# Patient Record
Sex: FEMALE | URBAN - METROPOLITAN AREA
[De-identification: names, ages, dates, MRNs, and addresses within clinical notes are randomized per-mention and may not be internally consistent; named-entity substitution may affect disease eponyms.]

---

## 2023-01-16 ENCOUNTER — E-VISIT (OUTPATIENT)
Dept: TELEHEALTH | Age: 23
End: 2023-01-16
Payer: COMMERCIAL

## 2023-01-16 DIAGNOSIS — J40 BRONCHITIS: Primary | ICD-10-CM

## 2023-01-16 PROCEDURE — 99421 OL DIG E/M SVC 5-10 MIN: CPT | Performed by: PHYSICIAN ASSISTANT

## 2023-01-17 RX ORDER — BENZONATATE 200 MG/1
200 CAPSULE ORAL 3 TIMES DAILY PRN
Qty: 30 CAPSULE | Refills: 0 | Status: SHIPPED | OUTPATIENT
Start: 2023-01-17

## 2023-01-17 RX ORDER — PREDNISONE 20 MG/1
40 TABLET ORAL DAILY
Qty: 10 TABLET | Refills: 0 | Status: SHIPPED | OUTPATIENT
Start: 2023-01-17 | End: 2023-01-22

## 2023-01-17 RX ORDER — LEVONORGESTREL AND ETHINYL ESTRADIOL 0.1-0.02MG
1 KIT ORAL DAILY
COMMUNITY
Start: 2022-12-31

## 2023-01-23 ENCOUNTER — E-VISIT (OUTPATIENT)
Dept: TELEHEALTH | Age: 23
End: 2023-01-23

## 2023-01-23 DIAGNOSIS — J01.90 ACUTE SINUSITIS, RECURRENCE NOT SPECIFIED, UNSPECIFIED LOCATION: Primary | ICD-10-CM

## 2023-01-23 PROCEDURE — 99421 OL DIG E/M SVC 5-10 MIN: CPT | Performed by: PHYSICIAN ASSISTANT

## 2023-01-23 RX ORDER — AMOXICILLIN AND CLAVULANATE POTASSIUM 875; 125 MG/1; MG/1
1 TABLET, FILM COATED ORAL 2 TIMES DAILY
Qty: 14 TABLET | Refills: 0 | Status: SHIPPED | OUTPATIENT
Start: 2023-01-23 | End: 2023-01-30

## 2023-02-24 ENCOUNTER — E-VISIT (OUTPATIENT)
Dept: TELEHEALTH | Age: 23
End: 2023-02-24

## 2023-02-24 DIAGNOSIS — J06.9 VIRAL URI WITH COUGH: Primary | ICD-10-CM

## 2023-02-28 ENCOUNTER — OFFICE VISIT (OUTPATIENT)
Dept: FAMILY MEDICINE CLINIC | Facility: CLINIC | Age: 23
End: 2023-02-28
Payer: COMMERCIAL

## 2023-02-28 VITALS
DIASTOLIC BLOOD PRESSURE: 72 MMHG | WEIGHT: 162 LBS | HEART RATE: 87 BPM | RESPIRATION RATE: 16 BRPM | TEMPERATURE: 98 F | OXYGEN SATURATION: 99 % | SYSTOLIC BLOOD PRESSURE: 126 MMHG

## 2023-02-28 DIAGNOSIS — J30.9 ALLERGIC RHINITIS, UNSPECIFIED SEASONALITY, UNSPECIFIED TRIGGER: Primary | ICD-10-CM

## 2023-02-28 DIAGNOSIS — R19.7 DIARRHEA, UNSPECIFIED TYPE: ICD-10-CM

## 2023-02-28 PROCEDURE — 3078F DIAST BP <80 MM HG: CPT | Performed by: FAMILY MEDICINE

## 2023-02-28 PROCEDURE — 99213 OFFICE O/P EST LOW 20 MIN: CPT | Performed by: FAMILY MEDICINE

## 2023-02-28 PROCEDURE — 3074F SYST BP LT 130 MM HG: CPT | Performed by: FAMILY MEDICINE

## 2023-02-28 RX ORDER — MOMETASONE FUROATE 50 UG/1
2 SPRAY, METERED NASAL DAILY
Qty: 1 EACH | Refills: 1 | Status: SHIPPED | OUTPATIENT
Start: 2023-02-28 | End: 2023-02-28

## 2023-02-28 RX ORDER — LORATADINE 10 MG/1
10 TABLET ORAL DAILY
Qty: 30 TABLET | Refills: 0 | Status: SHIPPED | OUTPATIENT
Start: 2023-02-28

## 2023-02-28 RX ORDER — MOMETASONE FUROATE 50 UG/1
2 SPRAY, METERED NASAL DAILY
Qty: 1 EACH | Refills: 1 | Status: SHIPPED | OUTPATIENT
Start: 2023-02-28 | End: 2023-03-30

## 2023-03-06 ENCOUNTER — OFFICE VISIT (OUTPATIENT)
Dept: FAMILY MEDICINE CLINIC | Facility: CLINIC | Age: 23
End: 2023-03-06
Payer: COMMERCIAL

## 2023-03-06 VITALS
SYSTOLIC BLOOD PRESSURE: 137 MMHG | DIASTOLIC BLOOD PRESSURE: 75 MMHG | RESPIRATION RATE: 18 BRPM | HEIGHT: 61 IN | WEIGHT: 163 LBS | TEMPERATURE: 99 F | HEART RATE: 96 BPM | BODY MASS INDEX: 30.78 KG/M2 | OXYGEN SATURATION: 97 %

## 2023-03-06 DIAGNOSIS — R09.81 SINUS CONGESTION: ICD-10-CM

## 2023-03-06 DIAGNOSIS — U07.1 POSITIVE SELF-ADMINISTERED ANTIGEN TEST FOR COVID-19: Primary | ICD-10-CM

## 2023-03-06 DIAGNOSIS — B34.9 VIRAL SYNDROME: ICD-10-CM

## 2023-03-06 DIAGNOSIS — J02.9 SORE THROAT: ICD-10-CM

## 2023-03-06 LAB
CONTROL LINE PRESENT WITH A CLEAR BACKGROUND (YES/NO): YES YES/NO
OPERATOR ID: NORMAL
POCT LOT NUMBER: NORMAL
RAPID SARS-COV-2 BY PCR: NOT DETECTED
STREP GRP A CUL-SCR: NEGATIVE

## 2023-03-06 PROCEDURE — 87637 SARSCOV2&INF A&B&RSV AMP PRB: CPT | Performed by: NURSE PRACTITIONER

## 2023-03-06 PROCEDURE — 87081 CULTURE SCREEN ONLY: CPT | Performed by: NURSE PRACTITIONER

## 2023-03-07 LAB
FLUAV + FLUBV RNA SPEC NAA+PROBE: NOT DETECTED
FLUAV + FLUBV RNA SPEC NAA+PROBE: NOT DETECTED
RSV RNA SPEC NAA+PROBE: NOT DETECTED
SARS-COV-2 RNA RESP QL NAA+PROBE: NOT DETECTED

## 2023-03-07 NOTE — PATIENT INSTRUCTIONS
1. Rest. Drink plenty of fluids. 2. Supportive care as discussed. 3. Home covid-19 test appears positive. Our rapid covid-19 test is negative. We do send out PCR test to lab. Pt advised that safest approach would be to self quarantine per CDC guidelines. (Until 5 days from onset of symptoms. If you have no symptoms or your symptoms are resolving after 5 days, you can leave your house. Continue to wear a mask around others for 5 additional days. If symptoms not resolved at 5 days continue quarantine for up to 10 days from onset of symptoms AND no fever for at least 24hrs, AND and improvement in symptoms. 4. Follow up with PMD in 4-5 days for re-eval. Go to the emergency department immediately if symptoms worsen, change, you develop chest discomfort, wheezing, shortness of breath, or if you have any concerns.

## 2023-03-08 ENCOUNTER — TELEPHONE (OUTPATIENT)
Dept: FAMILY MEDICINE CLINIC | Facility: CLINIC | Age: 23
End: 2023-03-08

## 2023-03-08 NOTE — TELEPHONE ENCOUNTER
Received message to call pt. We discussed negative viral panel results. Pt notes chills, body aches, headaches have resolved. Notes still having nonproductive cough. She notes she is taking tylenol at home. Treatment options discussed with patient and explained in detail. We reviewed symptomatic care at home and advised follow up with PCP in 2-3 days. . The risks, benefits and potential side effects of the medications were reviewed. Alternatives were discussed. Monitoring parameters and expected course outlined. Patient to go to emergency department if symptoms fail to respond as outlined, or worsen in any way. The patient agreed with the plan.

## 2023-03-09 ENCOUNTER — E-VISIT (OUTPATIENT)
Dept: TELEHEALTH | Age: 23
End: 2023-03-09

## 2023-03-09 DIAGNOSIS — J06.9 RECURRENT URI (UPPER RESPIRATORY INFECTION): Primary | ICD-10-CM

## 2023-03-09 DIAGNOSIS — R05.8 PRODUCTIVE COUGH: ICD-10-CM

## 2023-03-09 RX ORDER — DOXYCYCLINE HYCLATE 100 MG/1
100 CAPSULE ORAL 2 TIMES DAILY
Qty: 20 CAPSULE | Refills: 0 | Status: SHIPPED | OUTPATIENT
Start: 2023-03-09 | End: 2023-03-19

## 2023-03-23 ENCOUNTER — OFFICE VISIT (OUTPATIENT)
Dept: FAMILY MEDICINE CLINIC | Facility: CLINIC | Age: 23
End: 2023-03-23
Payer: COMMERCIAL

## 2023-03-23 VITALS
DIASTOLIC BLOOD PRESSURE: 70 MMHG | WEIGHT: 164 LBS | BODY MASS INDEX: 31 KG/M2 | HEART RATE: 105 BPM | OXYGEN SATURATION: 96 % | TEMPERATURE: 98 F | SYSTOLIC BLOOD PRESSURE: 114 MMHG

## 2023-03-23 DIAGNOSIS — Z13.1 SCREENING FOR DIABETES MELLITUS: ICD-10-CM

## 2023-03-23 DIAGNOSIS — R53.83 OTHER FATIGUE: ICD-10-CM

## 2023-03-23 DIAGNOSIS — J31.0 CHRONIC RHINITIS: ICD-10-CM

## 2023-03-23 DIAGNOSIS — R05.3 CHRONIC COUGH: Primary | ICD-10-CM

## 2023-03-23 DIAGNOSIS — R00.0 TACHYCARDIA: ICD-10-CM

## 2023-03-23 DIAGNOSIS — Z13.0 SCREENING, IRON DEFICIENCY ANEMIA: ICD-10-CM

## 2023-03-23 DIAGNOSIS — Z13.29 SCREENING FOR THYROID DISORDER: ICD-10-CM

## 2023-03-23 LAB
ALBUMIN SERPL-MCNC: 3.9 G/DL (ref 3.4–5)
ALBUMIN/GLOB SERPL: 1 {RATIO} (ref 1–2)
ALP LIVER SERPL-CCNC: 71 U/L
ALT SERPL-CCNC: 46 U/L
ANION GAP SERPL CALC-SCNC: 6 MMOL/L (ref 0–18)
AST SERPL-CCNC: 26 U/L (ref 15–37)
BASOPHILS # BLD AUTO: 0.06 X10(3) UL (ref 0–0.2)
BASOPHILS NFR BLD AUTO: 1 %
BILIRUB SERPL-MCNC: 0.4 MG/DL (ref 0.1–2)
BUN BLD-MCNC: 11 MG/DL (ref 7–18)
CALCIUM BLD-MCNC: 9.5 MG/DL (ref 8.5–10.1)
CHLORIDE SERPL-SCNC: 111 MMOL/L (ref 98–112)
CO2 SERPL-SCNC: 23 MMOL/L (ref 21–32)
CREAT BLD-MCNC: 0.67 MG/DL
EOSINOPHIL # BLD AUTO: 0.1 X10(3) UL (ref 0–0.7)
EOSINOPHIL NFR BLD AUTO: 1.6 %
ERYTHROCYTE [DISTWIDTH] IN BLOOD BY AUTOMATED COUNT: 12.6 %
EST. AVERAGE GLUCOSE BLD GHB EST-MCNC: 105 MG/DL (ref 68–126)
FASTING STATUS PATIENT QL REPORTED: NO
GFR SERPLBLD BASED ON 1.73 SQ M-ARVRAT: 127 ML/MIN/1.73M2 (ref 60–?)
GLOBULIN PLAS-MCNC: 3.9 G/DL (ref 2.8–4.4)
GLUCOSE BLD-MCNC: 86 MG/DL (ref 70–99)
HBA1C MFR BLD: 5.3 % (ref ?–5.7)
HCT VFR BLD AUTO: 45.6 %
HGB BLD-MCNC: 14.8 G/DL
IMM GRANULOCYTES # BLD AUTO: 0.01 X10(3) UL (ref 0–1)
IMM GRANULOCYTES NFR BLD: 0.2 %
LYMPHOCYTES # BLD AUTO: 1.53 X10(3) UL (ref 1–4)
LYMPHOCYTES NFR BLD AUTO: 24.3 %
MCH RBC QN AUTO: 28.5 PG (ref 26–34)
MCHC RBC AUTO-ENTMCNC: 32.5 G/DL (ref 31–37)
MCV RBC AUTO: 87.9 FL
MONOCYTES # BLD AUTO: 0.67 X10(3) UL (ref 0.1–1)
MONOCYTES NFR BLD AUTO: 10.6 %
NEUTROPHILS # BLD AUTO: 3.93 X10 (3) UL (ref 1.5–7.7)
NEUTROPHILS # BLD AUTO: 3.93 X10(3) UL (ref 1.5–7.7)
NEUTROPHILS NFR BLD AUTO: 62.3 %
OSMOLALITY SERPL CALC.SUM OF ELEC: 289 MOSM/KG (ref 275–295)
PLATELET # BLD AUTO: 359 10(3)UL (ref 150–450)
POTASSIUM SERPL-SCNC: 4 MMOL/L (ref 3.5–5.1)
PROT SERPL-MCNC: 7.8 G/DL (ref 6.4–8.2)
RBC # BLD AUTO: 5.19 X10(6)UL
SODIUM SERPL-SCNC: 140 MMOL/L (ref 136–145)
T4 FREE SERPL-MCNC: 1.1 NG/DL (ref 0.8–1.7)
TSI SER-ACNC: 1.21 MIU/ML (ref 0.36–3.74)
VIT D+METAB SERPL-MCNC: 23 NG/ML (ref 30–100)
WBC # BLD AUTO: 6.3 X10(3) UL (ref 4–11)

## 2023-03-23 PROCEDURE — 82306 VITAMIN D 25 HYDROXY: CPT | Performed by: NURSE PRACTITIONER

## 2023-03-23 PROCEDURE — 86003 ALLG SPEC IGE CRUDE XTRC EA: CPT | Performed by: NURSE PRACTITIONER

## 2023-03-23 PROCEDURE — 80050 GENERAL HEALTH PANEL: CPT | Performed by: NURSE PRACTITIONER

## 2023-03-23 PROCEDURE — 83036 HEMOGLOBIN GLYCOSYLATED A1C: CPT | Performed by: NURSE PRACTITIONER

## 2023-03-23 PROCEDURE — 3078F DIAST BP <80 MM HG: CPT | Performed by: NURSE PRACTITIONER

## 2023-03-23 PROCEDURE — 99214 OFFICE O/P EST MOD 30 MIN: CPT | Performed by: NURSE PRACTITIONER

## 2023-03-23 PROCEDURE — 82785 ASSAY OF IGE: CPT | Performed by: NURSE PRACTITIONER

## 2023-03-23 PROCEDURE — 84439 ASSAY OF FREE THYROXINE: CPT | Performed by: NURSE PRACTITIONER

## 2023-03-23 PROCEDURE — 3074F SYST BP LT 130 MM HG: CPT | Performed by: NURSE PRACTITIONER

## 2023-03-23 RX ORDER — LEVONORGESTREL AND ETHINYL ESTRADIOL 0.1-0.02MG
1 KIT ORAL DAILY
Qty: 28 TABLET | Refills: 12 | COMMUNITY
Start: 2023-03-23

## 2023-03-24 ENCOUNTER — PATIENT MESSAGE (OUTPATIENT)
Dept: FAMILY MEDICINE CLINIC | Facility: CLINIC | Age: 23
End: 2023-03-24

## 2023-03-24 ENCOUNTER — TELEPHONE (OUTPATIENT)
Dept: FAMILY MEDICINE CLINIC | Facility: CLINIC | Age: 23
End: 2023-03-24

## 2023-03-24 LAB
A ALTERNATA IGE QN: <0.1 KUA/L (ref ?–0.1)
A FUMIGATUS IGE QN: <0.1 KUA/L (ref ?–0.1)
AMER SYCAMORE IGE QN: <0.1 KUA/L (ref ?–0.1)
BERMUDA GRASS IGE QN: <0.1 KUA/L (ref ?–0.1)
BOXELDER IGE QN: <0.1 KUA/L (ref ?–0.1)
C HERBARUM IGE QN: <0.1 KUA/L (ref ?–0.1)
CALIF WALNUT IGE QN: <0.1 KUA/L (ref ?–0.1)
CAT DANDER IGE QN: <0.1 KUA/L (ref ?–0.1)
CLAM IGE QN: <0.1 KUA/L (ref ?–0.1)
CMN PIGWEED IGE QN: <0.1 KUA/L (ref ?–0.1)
CODFISH IGE QN: <0.1 KUA/L (ref ?–0.1)
COMMON RAGWEED IGE QN: <0.1 KUA/L (ref ?–0.1)
CORN IGE QN: <0.1 KUA/L (ref ?–0.1)
COTTONWOOD IGE QN: <0.1 KUA/L (ref ?–0.1)
COW MILK IGE QN: <0.1 KUA/L (ref ?–0.1)
D FARINAE IGE QN: 0.18 KUA/L (ref ?–0.1)
D PTERONYSS IGE QN: 0.18 KUA/L (ref ?–0.1)
DOG DANDER IGE QN: <0.1 KUA/L (ref ?–0.1)
EGG WHITE IGE QN: <0.1 KUA/L (ref ?–0.1)
IGE SERPL-ACNC: 111 KU/L (ref 2–214)
IGE SERPL-ACNC: 113 KU/L (ref 2–214)
M RACEMOSUS IGE QN: <0.1 KUA/L (ref ?–0.1)
MARSH ELDER IGE QN: <0.1 KUA/L (ref ?–0.1)
MOUSE EPITH IGE QN: <0.1 KUA/L (ref ?–0.1)
MT JUNIPER IGE QN: <0.1 KUA/L (ref ?–0.1)
P NOTATUM IGE QN: <0.1 KUA/L (ref ?–0.1)
PEANUT IGE QN: <0.1 KUA/L (ref ?–0.1)
PECAN/HICK TREE IGE QN: <0.1 KUA/L (ref ?–0.1)
ROACH IGE QN: 0.15 KUA/L (ref ?–0.1)
SALTWORT IGE QN: <0.1 KUA/L (ref ?–0.1)
SCALLOP IGE QN: <0.1 KUA/L (ref ?–0.1)
SESAME SEED IGE QN: <0.1 KUA/L (ref ?–0.1)
SHRIMP IGE QN: 0.14 KUA/L (ref ?–0.1)
SOYBEAN IGE QN: <0.1 KUA/L (ref ?–0.1)
TIMOTHY IGE QN: <0.1 KUA/L (ref ?–0.1)
WALNUT IGE QN: <0.1 KUA/L (ref ?–0.1)
WHEAT IGE QN: <0.1 KUA/L (ref ?–0.1)
WHITE ASH IGE QN: <0.1 KUA/L (ref ?–0.1)
WHITE ELM IGE QN: <0.1 KUA/L (ref ?–0.1)
WHITE MULBERRY IGE QN: <0.1 KUA/L (ref ?–0.1)
WHITE OAK IGE QN: <0.1 KUA/L (ref ?–0.1)

## 2023-03-24 NOTE — TELEPHONE ENCOUNTER
Patient advised and verbalized understanding. Patient also notified she is allergic to shrimp and to avoid shellfish. Patient verbalized understanding.

## 2023-03-24 NOTE — TELEPHONE ENCOUNTER
----- Message from SILVIO Dutton sent at 3/23/2023  5:02 PM CDT -----  Vitamin D mildly low. Start oral Vitamin D 2000 international units daily.  Recheck 3 mo  Chemistries normal  Thyroid normal  No anemia or abnormalities with CBC

## 2023-03-24 NOTE — TELEPHONE ENCOUNTER
From: Bonnie Biggs  To: SILVIO Moore  Sent: 3/24/2023 9:06 AM CDT  Subject: Question    Hi! I know that you probably still have to check out my results since they just came through. Will i see the allergy results on here too?

## 2023-04-03 ENCOUNTER — E-VISIT (OUTPATIENT)
Dept: TELEHEALTH | Age: 23
End: 2023-04-03

## 2023-04-03 DIAGNOSIS — H10.32 ACUTE CONJUNCTIVITIS OF LEFT EYE, UNSPECIFIED ACUTE CONJUNCTIVITIS TYPE: Primary | ICD-10-CM

## 2023-04-03 PROCEDURE — 99421 OL DIG E/M SVC 5-10 MIN: CPT | Performed by: NURSE PRACTITIONER

## 2023-04-03 RX ORDER — POLYMYXIN B SULFATE AND TRIMETHOPRIM 1; 10000 MG/ML; [USP'U]/ML
1 SOLUTION OPHTHALMIC EVERY 4 HOURS
Qty: 1 EACH | Refills: 0 | Status: SHIPPED | OUTPATIENT
Start: 2023-04-03 | End: 2023-04-10

## 2023-04-06 ENCOUNTER — E-VISIT (OUTPATIENT)
Dept: TELEHEALTH | Age: 23
End: 2023-04-06

## 2023-04-06 DIAGNOSIS — Z02.9 ENCOUNTERS FOR ADMINISTRATIVE PURPOSES: Primary | ICD-10-CM

## 2023-04-11 ENCOUNTER — OFFICE VISIT (OUTPATIENT)
Dept: FAMILY MEDICINE CLINIC | Facility: CLINIC | Age: 23
End: 2023-04-11
Payer: COMMERCIAL

## 2023-04-11 VITALS
SYSTOLIC BLOOD PRESSURE: 110 MMHG | HEIGHT: 61 IN | BODY MASS INDEX: 30.36 KG/M2 | HEART RATE: 82 BPM | OXYGEN SATURATION: 98 % | DIASTOLIC BLOOD PRESSURE: 66 MMHG | TEMPERATURE: 98 F | WEIGHT: 160.81 LBS | RESPIRATION RATE: 18 BRPM

## 2023-04-11 DIAGNOSIS — K21.9 GASTROESOPHAGEAL REFLUX DISEASE, UNSPECIFIED WHETHER ESOPHAGITIS PRESENT: Primary | ICD-10-CM

## 2023-04-11 PROCEDURE — 99213 OFFICE O/P EST LOW 20 MIN: CPT | Performed by: FAMILY MEDICINE

## 2023-04-11 RX ORDER — PANTOPRAZOLE SODIUM 40 MG/1
40 TABLET, DELAYED RELEASE ORAL
Qty: 30 TABLET | Refills: 0 | Status: SHIPPED | OUTPATIENT
Start: 2023-04-11 | End: 2023-05-11

## 2023-04-11 NOTE — PATIENT INSTRUCTIONS
Tiago Rizvi! Please take the pantoprazole daily for 2-3 weeks and then stop it. If symptoms resolve and do not return you do not need to do anything further. If symptoms return please see your PCP for further evaluation. If at any time you develop severe abdominal pain or vomiting that does not stop please go to the nearest emergency room.

## 2023-05-18 ENCOUNTER — TELEMEDICINE (OUTPATIENT)
Dept: FAMILY MEDICINE CLINIC | Facility: CLINIC | Age: 23
End: 2023-05-18
Payer: COMMERCIAL

## 2023-05-18 ENCOUNTER — TELEPHONE (OUTPATIENT)
Dept: FAMILY MEDICINE CLINIC | Facility: CLINIC | Age: 23
End: 2023-05-18

## 2023-05-18 DIAGNOSIS — R11.2 NAUSEA AND VOMITING, UNSPECIFIED VOMITING TYPE: ICD-10-CM

## 2023-05-18 DIAGNOSIS — K21.9 GASTROESOPHAGEAL REFLUX DISEASE, UNSPECIFIED WHETHER ESOPHAGITIS PRESENT: Primary | ICD-10-CM

## 2023-05-18 PROCEDURE — 99213 OFFICE O/P EST LOW 20 MIN: CPT | Performed by: NURSE PRACTITIONER

## 2023-05-18 RX ORDER — PANTOPRAZOLE SODIUM 40 MG/1
40 TABLET, DELAYED RELEASE ORAL
Qty: 90 TABLET | Refills: 0 | Status: SHIPPED | OUTPATIENT
Start: 2023-05-18 | End: 2023-08-16

## 2023-06-16 RX ORDER — LEVONORGESTREL AND ETHINYL ESTRADIOL 0.1-0.02MG
1 KIT ORAL DAILY
Qty: 28 TABLET | Refills: 12 | Status: SHIPPED | OUTPATIENT
Start: 2023-06-16

## 2023-06-16 NOTE — TELEPHONE ENCOUNTER
Gynecology Medication Protocol Failed 06/16/2023 11:18 AM    PASS-PENDING LAST PAP WNL--VIA MANUAL LOOKUP    Physical or Pelvic/Breast in past 12 or next 3 mos--VIA MANUAL LOOKUP     Last OV 3/23/23  Not filled previously, listed as historical

## 2023-06-17 ENCOUNTER — TELEPHONE (OUTPATIENT)
Dept: FAMILY MEDICINE CLINIC | Facility: CLINIC | Age: 23
End: 2023-06-17

## 2023-06-19 ENCOUNTER — TELEPHONE (OUTPATIENT)
Dept: FAMILY MEDICINE CLINIC | Facility: CLINIC | Age: 23
End: 2023-06-19

## 2023-06-19 DIAGNOSIS — R79.89 LOW VITAMIN D LEVEL: Primary | ICD-10-CM

## 2023-07-10 DIAGNOSIS — K21.9 GASTROESOPHAGEAL REFLUX DISEASE, UNSPECIFIED WHETHER ESOPHAGITIS PRESENT: ICD-10-CM

## 2023-07-10 DIAGNOSIS — R11.2 NAUSEA AND VOMITING, UNSPECIFIED VOMITING TYPE: ICD-10-CM

## 2023-07-10 RX ORDER — PANTOPRAZOLE SODIUM 40 MG/1
TABLET, DELAYED RELEASE ORAL
Qty: 30 TABLET | Refills: 2 | OUTPATIENT
Start: 2023-07-10

## 2023-07-10 NOTE — TELEPHONE ENCOUNTER
Patient advised. Verbalized understanding.    States still looking into best coverage with her ins for GI  Will call back to schedule Px

## 2023-07-10 NOTE — TELEPHONE ENCOUNTER
LOV 3/23/23 to establish care and cough. Medication Quantity Refills Start End   pantoprazole 40 MG Oral Tab EC 90 tablet 0 5/18/2023 8/16/2023   Sig:   Take 1 tablet (40 mg total) by mouth every morning before breakfast.       No future appointments. Please advise - needs to scheduled px? Unsure who is PCP. ..

## 2023-07-10 NOTE — TELEPHONE ENCOUNTER
Telemed visit done in May  H.Pylori testing ordered but not done  GI referral placed, did she schedule?     Needs to schedule PX

## 2023-07-12 ENCOUNTER — E-VISIT (OUTPATIENT)
Dept: TELEHEALTH | Age: 23
End: 2023-07-12

## 2023-07-12 DIAGNOSIS — B34.9 ACUTE VIRAL SYNDROME: Primary | ICD-10-CM

## 2023-07-12 PROCEDURE — 99422 OL DIG E/M SVC 11-20 MIN: CPT | Performed by: NURSE PRACTITIONER

## 2023-07-13 NOTE — PROGRESS NOTES
Refer to patient Questionnaire and responses. See MyChart messages. 15 minutes spent in direct communication with patient via [facility].

## 2023-07-19 ENCOUNTER — TELEPHONE (OUTPATIENT)
Dept: FAMILY MEDICINE CLINIC | Facility: CLINIC | Age: 23
End: 2023-07-19

## 2023-08-24 ENCOUNTER — TELEPHONE (OUTPATIENT)
Dept: FAMILY MEDICINE CLINIC | Facility: CLINIC | Age: 23
End: 2023-08-24

## 2023-08-24 RX ORDER — LEVONORGESTREL AND ETHINYL ESTRADIOL 0.1-0.02MG
1 KIT ORAL DAILY
Qty: 28 TABLET | Refills: 0 | Status: SHIPPED | OUTPATIENT
Start: 2023-08-24

## 2023-08-24 NOTE — TELEPHONE ENCOUNTER
LOV: 5/18/23 GERD    Levonorgestrel-Ethinyl Estrad (SRONYX) 0.1-20 MG-MCG Oral Tab  Take 1 tablet by mouth daily. Dispense: 28 tablet, Refills: 12 ordered       06/16/2023     No future appointments.   Please advise    Gynecology Medication Protocol Otcoap1208/24/2023 03:58 PM    PASS-PENDING LAST PAP WNL--VIA MANUAL LOOKUP    Physical or Pelvic/Breast in past 12 or next 3 mos--VIA MANUAL LOOKUP

## 2023-08-25 NOTE — TELEPHONE ENCOUNTER
Pt scheduled for   Future Appointments   Date Time Provider Deon Gray   9/14/2023  4:20 PM SILVIO Byrne [facility] Lynda Nunez

## 2023-09-28 ENCOUNTER — E-VISIT (OUTPATIENT)
Dept: TELEHEALTH | Age: 23
End: 2023-09-28
Payer: COMMERCIAL

## 2023-09-28 DIAGNOSIS — R05.1 ACUTE COUGH: ICD-10-CM

## 2023-09-28 DIAGNOSIS — J01.40 ACUTE NON-RECURRENT PANSINUSITIS: Primary | ICD-10-CM

## 2023-09-28 RX ORDER — BENZONATATE 200 MG/1
CAPSULE ORAL
Qty: 30 CAPSULE | Refills: 0 | Status: SHIPPED | OUTPATIENT
Start: 2023-09-28

## 2023-09-28 RX ORDER — AMOXICILLIN AND CLAVULANATE POTASSIUM 875; 125 MG/1; MG/1
1 TABLET, FILM COATED ORAL 2 TIMES DAILY
Qty: 20 TABLET | Refills: 0 | Status: SHIPPED | OUTPATIENT
Start: 2023-09-28 | End: 2023-10-08

## 2023-09-29 NOTE — PROGRESS NOTES
Refer to patient Questionnaire and responses. See MyChart messages. 25 minutes spent in direct communication with patient via [facility].

## 2023-10-30 RX ORDER — LEVONORGESTREL AND ETHINYL ESTRADIOL 0.1-0.02MG
1 KIT ORAL DAILY
Qty: 28 TABLET | Refills: 0 | Status: SHIPPED | OUTPATIENT
Start: 2023-10-30

## 2023-10-30 NOTE — TELEPHONE ENCOUNTER
LOV 3/23/23 for a cough. Patient comment: Gregory Scott! I know we talked last about how you want to do an exam before prescribing more to me. I am unfortunately almost out and do need it so I dont miss, but my work schedule comes out this Wednesday so I will then schedule an appointment that works! Gynecology Medication Protocol Oeabjz29/30/2023 12:02 PM    PASS-PENDING LAST PAP WNL--VIA MANUAL LOOKUP    Physical or Pelvic/Breast in past 12 or next 3 mos--VIA MANUAL LOOKUP          No future appointments. Please advise.

## 2023-11-02 ENCOUNTER — E-VISIT (OUTPATIENT)
Dept: TELEHEALTH | Age: 23
End: 2023-11-02
Payer: COMMERCIAL

## 2023-11-02 DIAGNOSIS — J06.9 ACUTE URI: Primary | ICD-10-CM

## 2023-11-02 PROCEDURE — 99421 OL DIG E/M SVC 5-10 MIN: CPT | Performed by: PHYSICIAN ASSISTANT

## 2023-11-02 NOTE — PROGRESS NOTES
Leonel Easton is a 26year old female who initiated e-visit care today. HPI:   See answers to questionnaire submission     Current Outpatient Medications   Medication Sig Dispense Refill    Levonorgestrel-Ethinyl Estrad (SRONYX) 0.1-20 MG-MCG Oral Tab Take 1 tablet by mouth daily. 28 tablet 0    benzonatate 200 MG Oral Cap Take 1 tablet po tid as needed for cough 30 capsule 0    loratadine (CLARITIN) 10 MG Oral Tab Take 1 tablet (10 mg total) by mouth daily. 30 tablet 0      Past Medical History:   Diagnosis Date    Allergic rhinitis       Past Surgical History:   Procedure Laterality Date    DENTAL SURGERY PROCEDURE      DIAPHRAGMATIC HERNIA REPAIR      TONSILLECTOMY        Family History   Problem Relation Age of Onset    COPD Father     Asthma Brother         since having COVID 2022      Social History:  Social History     Socioeconomic History    Marital status: Single   Tobacco Use    Smoking status: Never    Smokeless tobacco: Never         ASSESSMENT AND PLAN:       Diagnoses and all orders for this visit:    Acute URI       Viral vs bacterial infections discussed. Advised seeking ENT as has numerous telehealth visits for sinus concerns. S/sx that warrant recheck discussed. Duration of  the service:  10 minutes      See [employer] message exchange and Patient Instructions for Comfort Care and patient education.

## 2024-02-24 NOTE — TELEPHONE ENCOUNTER
I currently don’t have insurance through my fairly new job. I had to leave where I had insurance so I am unable to pay that out of my pocket at the current moment. My moms been helping and I may be able to get on her insurance soon or we can afford it shortly but currently we aren’t very able.

## 2024-02-24 NOTE — TELEPHONE ENCOUNTER
Gynecology Medication Protocol Vqbczy1002/23/2024 11:14 PM    PASS-PENDING LAST PAP WNL--VIA MANUAL LOOKUP    Physical or Pelvic/Breast in past 12 or next 3 mos--VIA MANUAL LOOKUP     Last OV 3/23/23 for cough  No future appointments.   Due for Px/pap  MCM sent

## 2024-02-26 RX ORDER — LEVONORGESTREL AND ETHINYL ESTRADIOL 0.1-0.02MG
1 KIT ORAL DAILY
Qty: 28 TABLET | Refills: 0 | Status: SHIPPED | OUTPATIENT
Start: 2024-02-26

## 2024-02-29 ENCOUNTER — E-VISIT (OUTPATIENT)
Dept: TELEHEALTH | Age: 24
End: 2024-02-29

## 2024-02-29 DIAGNOSIS — R10.9 ABDOMINAL PAIN, UNSPECIFIED ABDOMINAL LOCATION: Primary | ICD-10-CM

## 2024-02-29 PROCEDURE — 99212 OFFICE O/P EST SF 10 MIN: CPT | Performed by: NURSE PRACTITIONER

## 2024-02-29 NOTE — PROGRESS NOTES
Belkys Alvarado is a 24 year old female.  HPI:   See answers to questions above.   Mid abdominal pain. Treated with gas-x without improvement. Symptoms x 3 days.   Current Outpatient Medications   Medication Sig Dispense Refill    Levonorgestrel-Ethinyl Estrad (SRONYX) 0.1-20 MG-MCG Oral Tab Take 1 tablet by mouth daily. 28 tablet 0    benzonatate 200 MG Oral Cap Take 1 tablet po tid as needed for cough 30 capsule 0    loratadine (CLARITIN) 10 MG Oral Tab Take 1 tablet (10 mg total) by mouth daily. 30 tablet 0      Past Medical History:   Diagnosis Date    Allergic rhinitis       Past Surgical History:   Procedure Laterality Date    DENTAL SURGERY PROCEDURE      DIAPHRAGMATIC HERNIA REPAIR      TONSILLECTOMY        Family History   Problem Relation Age of Onset    COPD Father     Asthma Brother         since having COVID 2022      Social History:  Social History     Socioeconomic History    Marital status: Single   Tobacco Use    Smoking status: Never    Smokeless tobacco: Never         ASSESSMENT AND PLAN:     Encounter Diagnosis   Name Primary?    Abdominal pain, unspecified abdominal location Yes     Advised in person evaluation is necessary for abdominal pain. Advised if pain is moderate to severe should be seen in ER. If pain is more mild may be seen in [facility].         Meds & Refills for this Visit:  Requested Prescriptions      No prescriptions requested or ordered in this encounter       Duration of  the service:  6 minutes    Patient advised to follow up with PCP if no improvement or worsening of symptoms  Refer to [employer] message for specific patient instructions

## 2024-03-03 ENCOUNTER — HOSPITAL ENCOUNTER (OUTPATIENT)
Age: 24
Discharge: HOME | End: 2024-03-03

## 2024-03-03 VITALS
TEMPERATURE: 98 F | BODY MASS INDEX: 31.53 KG/M2 | HEART RATE: 115 BPM | HEIGHT: 61 IN | DIASTOLIC BLOOD PRESSURE: 87 MMHG | OXYGEN SATURATION: 98 % | SYSTOLIC BLOOD PRESSURE: 137 MMHG | RESPIRATION RATE: 18 BRPM | WEIGHT: 167 LBS

## 2024-03-03 DIAGNOSIS — R10.9 ABDOMINAL PAIN, ACUTE: Primary | ICD-10-CM

## 2024-03-03 LAB
#MXD IC: 0.6 X10ˆ3/UL (ref 0.1–1)
B-HCG UR QL: NEGATIVE
BILIRUB UR QL STRIP: NEGATIVE
BUN BLD-MCNC: 10 MG/DL (ref 7–18)
CHLORIDE BLD-SCNC: 105 MMOL/L (ref 98–112)
CLARITY UR: CLEAR
CO2 BLD-SCNC: 22 MMOL/L (ref 21–32)
COLOR UR: YELLOW
CREAT BLD-MCNC: 0.6 MG/DL
EGFRCR SERPLBLD CKD-EPI 2021: 130 ML/MIN/1.73M2 (ref 60–?)
GLUCOSE BLD-MCNC: 95 MG/DL (ref 70–99)
GLUCOSE UR STRIP-MCNC: NEGATIVE MG/DL
HCT VFR BLD AUTO: 44.4 %
HCT VFR BLD CALC: 43 %
HGB BLD-MCNC: 14.7 G/DL
HGB UR QL STRIP: NEGATIVE
ISTAT IONIZED CALCIUM FOR CHEM 8: 1.13 MMOL/L (ref 1.12–1.32)
KETONES UR STRIP-MCNC: NEGATIVE MG/DL
LEUKOCYTE ESTERASE UR QL STRIP: NEGATIVE
LYMPHOCYTES # BLD AUTO: 2 X10ˆ3/UL (ref 1–4)
LYMPHOCYTES NFR BLD AUTO: 33 %
MCH RBC QN AUTO: 28.3 PG (ref 26–34)
MCHC RBC AUTO-ENTMCNC: 33.1 G/DL (ref 31–37)
MCV RBC AUTO: 85.5 FL (ref 80–100)
MIXED CELL %: 10.6 %
NEUTROPHILS # BLD AUTO: 3.4 X10ˆ3/UL (ref 1.5–7.7)
NEUTROPHILS NFR BLD AUTO: 56.4 %
NITRITE UR QL STRIP: NEGATIVE
PH UR STRIP: 7.5 [PH]
PLATELET # BLD AUTO: 371 X10ˆ3/UL (ref 150–450)
POTASSIUM BLD-SCNC: 4 MMOL/L (ref 3.6–5.1)
RBC # BLD AUTO: 5.19 X10ˆ6/UL
SODIUM BLD-SCNC: 140 MMOL/L (ref 136–145)
SP GR UR STRIP: 1.02
UROBILINOGEN UR STRIP-ACNC: <2 MG/DL
WBC # BLD AUTO: 6 X10ˆ3/UL (ref 4–11)

## 2024-03-03 RX ORDER — OMEPRAZOLE 20 MG/1
20 CAPSULE, DELAYED RELEASE ORAL
COMMUNITY

## 2024-03-03 NOTE — ED PROVIDER NOTES
Patient Seen in: [facility]    History     Chief Complaint   Patient presents with    Abdominal Pain     Stated Complaint: Abdominal Pain    HPI    Patient complains of periumbilical  abdominal pain that began Tuesday.  Reports the pain feels like someone is intermittelty squessing her abdomen.   She reports this is not pain,   it is just a squeeze.  Reports that the pain is a 2/10.   Episodes last 10-15 seconds,  multiple times during the day.  She reports nothing makes the pain better or worse.  She is eating and drinking without difficulty.         Past Medical History:   Diagnosis Date    Allergic rhinitis        Past Surgical History:   Procedure Laterality Date    DENTAL SURGERY PROCEDURE      DIAPHRAGMATIC HERNIA REPAIR      TONSILLECTOMY              Family History   Problem Relation Age of Onset    COPD Father     Asthma Brother         since having COVID 2022       Social History     Socioeconomic History    Marital status: Single   Tobacco Use    Smoking status: Never    Smokeless tobacco: Never       Review of Systems    Positive for stated complaint: Abdominal Pain  Other systems are as noted in HPI.  Constitutional and vital signs reviewed.      All other systems reviewed and negative except as noted above.    PSFH elements reviewed from today and agreed except as otherwise stated in HPI.    Physical Exam     ED Triage Vitals [03/03/24 0838]   /87   Pulse 115   Resp 18   Temp 98.2 °F (36.8 °C)   Temp src Temporal   SpO2 98 %   O2 Device None (Room air)       Current:/87   Pulse 115   Temp 98.2 °F (36.8 °C) (Temporal)   Resp 18   Ht 154.9 cm (5' 1\")   Wt 75.8 kg   LMP 01/03/2024 (Exact Date)   SpO2 98%   BMI 31.55 kg/m²   Pulse ox 98% on RA         Physical Exam  General Appearance: alert, no distress  Eyes: pupils equal and round no pallor or injection  ENT, Mouth: mucous membranes moist  Respiratory: there are no retractions, lungs are clear to  auscultation  Cardiovascular: regular rate and rhythm    Gastrointestinal: soft, non tender, no mass, normal bowel sounds, no swelling, no ecchymosis, no pain at mcburney's point, negative easton sign.  Neurological: II-XII grossly intact  no focal deficits  Skin: warm and dry, no rashes.  Musculoskeletal: neck is supple non tender        Extremities are symmetrical, full range of motion  Psychiatric: patient is pleasant, there is no agitation        ED Course     Labs Reviewed   [facility] POCT URINALYSIS DIPSTICK - Abnormal; Notable for the following components:       Result Value    Protein urine Trace (*)     All other components within normal limits   POCT ISTAT CHEM8 CARTRIDGE - Normal   POCT PREGNANCY URINE - Normal   POCT CBC     I have personally  reviewed available prior medical records for any recent pertinent discharge summaries/testing. Patient/family updated on results and plan, a verbalized understanding and agreement with the plan.  I explained to the patient that emergent conditions may arise and to go to the ER for new, worsening or any persistent conditions. I've explained the importance of taking all medicatons as prescribed, follow up, and return precuations,  All questions answered.    Please note that this report has been produced using speech recognition software and may contain errors related to that system including, but not limited to, errors in grammar, punctuation, and spelling, as well as words and phrases that possibly may have been recognized inappropriately.  If there are any questions or concerns, contact the dictating provider for clarification.  MDM         Patient presents with abdominal pain, nausea and emesis. Basic labs were performed and did not reveal any leukocytosis, elevated creatinine or electrolyte abnormality.  Patient is afebrile, does not appear toxic and does not meet SIRS criteria.  . Patient does not appear clinically dehydrated and is able to tolerate po. Encouraged  patient on oral hydration. UA does not reveal evidence of UTI. Pregnancy test is negative. No vaginal discharge or suprapubic tenderness.      Discussed all labs which are wnl,  f/u with pmd return to ED if they develop fever >103, worsened pain, persistent emesis or other new or worsened symptoms        Disposition and Plan     Clinical Impression:  1. Abdominal pain, acute        Disposition:  Discharge    Follow-up:  Malgorzata Wiley MD  [address] [id number] [phone]            Medications Prescribed:  Discharge Medication List as of 3/3/2024  9:46 AM

## 2024-03-03 NOTE — DISCHARGE INSTRUCTIONS
- increase fluid intake     - tylenol / ibuprofen for pain    - you should follow up with your primary care MD in 2-3 days     - return to the ER with any worsening symptoms or concerns

## 2024-03-03 NOTE — ED INITIAL ASSESSMENT (HPI)
Pt with intermittent upper abdominal pain that started on Tuesday.  Pt describes pain as a squeezing pain.    Pt has tried tums, gas x, laxative, and regularly takes Prilosec.     Denies, N/V/D or fever

## 2024-05-13 RX ORDER — LEVONORGESTREL/ETHIN.ESTRADIOL 0.1-0.02MG
1 TABLET ORAL DAILY
Qty: 28 TABLET | Refills: 0 | Status: SHIPPED | OUTPATIENT
Start: 2024-05-13

## 2024-05-18 RX ORDER — LEVONORGESTREL AND ETHINYL ESTRADIOL 0.1-0.02MG
1 KIT ORAL DAILY
Qty: 84 TABLET | Refills: 4 | OUTPATIENT
Start: 2024-05-18

## 2024-06-05 ENCOUNTER — PATIENT MESSAGE (OUTPATIENT)
Dept: FAMILY MEDICINE CLINIC | Facility: CLINIC | Age: 24
End: 2024-06-05

## 2024-06-05 RX ORDER — LEVONORGESTREL/ETHIN.ESTRADIOL 0.1-0.02MG
1 TABLET ORAL DAILY
Qty: 28 TABLET | Refills: 0
Start: 2024-06-05

## 2024-06-05 RX ORDER — LEVONORGESTREL/ETHIN.ESTRADIOL 0.1-0.02MG
1 TABLET ORAL DAILY
Qty: 28 TABLET | Refills: 0 | OUTPATIENT
Start: 2024-06-05

## 2024-06-05 NOTE — TELEPHONE ENCOUNTER
Unfortunately I cannot continue filling this medications since we have not seen her since 3/2023. We also let her know this last month  I would recommend seeing provider at [facility]. They can provide the care she needs on sliding scale basis based on her income.

## 2024-06-05 NOTE — TELEPHONE ENCOUNTER
I’d also like to inform that i did just [facility] and unfortunately their soonest appointment is June 20th. Which i once again, leave for a 3 week trip this upcoming Tuesday. I won’t be back in time.        Belkys GALVAN [facility] Clinical Staff (supporting SILVIO Frost)7 minutes ago (11:56 AM)     BC  What’s the cost out of pocket at your job? I am incredibly uncomfortable being off of a medication that I have been on for so long now.

## 2024-06-05 NOTE — TELEPHONE ENCOUNTER
From: Belkys Alvarado  To: Christen Baum  Sent: 6/5/2024 11:31 AM CDT  Subject: Medication    I did just request a refill and was denied. I am leaving for 3 weeks in just a few days and will run out of my medicine on that trip, so I am requesting again.     I also unfortunately haven’t been able to access my insurance at my new job due to the fact that open enrollment has not taken place.     I am uncomfortable with being off of birth control for that long of a period of time. I plan on scheduling an appointment once I have insurance, but unfortunately I am not financially stable enough to pay for an appointment when I am not currently insured. It’s impossible for me.     Thank you.

## 2024-06-05 NOTE — TELEPHONE ENCOUNTER
5/13/24  8:08 AM  Note      Can prescribed 1 more month. Any further refills will need to have appt

## 2024-06-05 NOTE — TELEPHONE ENCOUNTER
Birth control last refilled 5/13/24  No future appointment  LOV with Aneta 3/23/23  No pap on file  Routed to advise refill       Patient Comment: Is there anyway to make a note that Im not okay with them substituting the sronyx with a generic/different brand? Theyve switched it at the pharmacy and I have mentioned it. I was hoping you can make a note for the pharmacy.     Gynecology Medication Protocol [id number]/05/2024 12:10 AM    PASS-PENDING LAST PAP WNL--VIA MANUAL LOOKUP    Physical or Pelvic/Breast in past 12 or next 3 mos--VIA MANUAL LOOKUP

## 2024-06-05 NOTE — TELEPHONE ENCOUNTER
Last OV 3/23/23  Last refilled on 5/13/24 for # 28 with 0 refills  No future appointments.     Thank you.

## 2025-04-08 ENCOUNTER — OFFICE VISIT (OUTPATIENT)
Dept: FAMILY MEDICINE CLINIC | Facility: CLINIC | Age: 25
End: 2025-04-08
Payer: COMMERCIAL

## 2025-04-08 VITALS
SYSTOLIC BLOOD PRESSURE: 120 MMHG | OXYGEN SATURATION: 99 % | BODY MASS INDEX: 34.23 KG/M2 | WEIGHT: 186 LBS | RESPIRATION RATE: 16 BRPM | TEMPERATURE: 99 F | HEIGHT: 62 IN | HEART RATE: 91 BPM | DIASTOLIC BLOOD PRESSURE: 64 MMHG

## 2025-04-08 DIAGNOSIS — Z12.4 SCREENING FOR CERVICAL CANCER: ICD-10-CM

## 2025-04-08 DIAGNOSIS — Z11.3 SCREENING EXAMINATION FOR STI: ICD-10-CM

## 2025-04-08 DIAGNOSIS — Z00.00 ANNUAL PHYSICAL EXAM: Primary | ICD-10-CM

## 2025-04-08 LAB
ALBUMIN SERPL-MCNC: 4.7 G/DL (ref 3.2–4.8)
ALBUMIN/GLOB SERPL: 1.8 {RATIO} (ref 1–2)
ALP LIVER SERPL-CCNC: 79 U/L
ALT SERPL-CCNC: 32 U/L
ANION GAP SERPL CALC-SCNC: 9 MMOL/L (ref 0–18)
AST SERPL-CCNC: 23 U/L (ref ?–34)
BASOPHILS # BLD AUTO: 0.02 X10(3) UL (ref 0–0.2)
BASOPHILS NFR BLD AUTO: 0.4 %
BILIRUB SERPL-MCNC: 0.5 MG/DL (ref 0.3–1.2)
BUN BLD-MCNC: 17 MG/DL (ref 9–23)
CALCIUM BLD-MCNC: 9.7 MG/DL (ref 8.7–10.6)
CHLORIDE SERPL-SCNC: 109 MMOL/L (ref 98–112)
CHOLEST SERPL-MCNC: 139 MG/DL (ref ?–200)
CO2 SERPL-SCNC: 23 MMOL/L (ref 21–32)
CREAT BLD-MCNC: 0.73 MG/DL
EGFRCR SERPLBLD CKD-EPI 2021: 118 ML/MIN/1.73M2 (ref 60–?)
EOSINOPHIL # BLD AUTO: 0.1 X10(3) UL (ref 0–0.7)
EOSINOPHIL NFR BLD AUTO: 2 %
ERYTHROCYTE [DISTWIDTH] IN BLOOD BY AUTOMATED COUNT: 11.9 %
FASTING PATIENT LIPID ANSWER: YES
FASTING STATUS PATIENT QL REPORTED: YES
GLOBULIN PLAS-MCNC: 2.6 G/DL (ref 2–3.5)
GLUCOSE BLD-MCNC: 93 MG/DL (ref 70–99)
HCT VFR BLD AUTO: 44.7 %
HDLC SERPL-MCNC: 45 MG/DL (ref 40–59)
HGB BLD-MCNC: 15.1 G/DL
IMM GRANULOCYTES # BLD AUTO: 0.01 X10(3) UL (ref 0–1)
IMM GRANULOCYTES NFR BLD: 0.2 %
LDLC SERPL CALC-MCNC: 77 MG/DL (ref ?–100)
LYMPHOCYTES # BLD AUTO: 1.58 X10(3) UL (ref 1–4)
LYMPHOCYTES NFR BLD AUTO: 31 %
MCH RBC QN AUTO: 29 PG (ref 26–34)
MCHC RBC AUTO-ENTMCNC: 33.8 G/DL (ref 31–37)
MCV RBC AUTO: 85.8 FL
MONOCYTES # BLD AUTO: 0.43 X10(3) UL (ref 0.1–1)
MONOCYTES NFR BLD AUTO: 8.4 %
NEUTROPHILS # BLD AUTO: 2.96 X10 (3) UL (ref 1.5–7.7)
NEUTROPHILS # BLD AUTO: 2.96 X10(3) UL (ref 1.5–7.7)
NEUTROPHILS NFR BLD AUTO: 58 %
NONHDLC SERPL-MCNC: 94 MG/DL (ref ?–130)
OSMOLALITY SERPL CALC.SUM OF ELEC: 293 MOSM/KG (ref 275–295)
PLATELET # BLD AUTO: 349 10(3)UL (ref 150–450)
POTASSIUM SERPL-SCNC: 4 MMOL/L (ref 3.5–5.1)
PROT SERPL-MCNC: 7.3 G/DL (ref 5.7–8.2)
RBC # BLD AUTO: 5.21 X10(6)UL
SODIUM SERPL-SCNC: 141 MMOL/L (ref 136–145)
T4 FREE SERPL-MCNC: 1.4 NG/DL (ref 0.8–1.7)
TRIGL SERPL-MCNC: 91 MG/DL (ref 30–149)
TSI SER-ACNC: 2.13 UIU/ML (ref 0.55–4.78)
VLDLC SERPL CALC-MCNC: 14 MG/DL (ref 0–30)
WBC # BLD AUTO: 5.1 X10(3) UL (ref 4–11)

## 2025-04-08 PROCEDURE — 87624 HPV HI-RISK TYP POOLED RSLT: CPT | Performed by: NURSE PRACTITIONER

## 2025-04-08 PROCEDURE — 80050 GENERAL HEALTH PANEL: CPT | Performed by: NURSE PRACTITIONER

## 2025-04-08 PROCEDURE — 3078F DIAST BP <80 MM HG: CPT | Performed by: NURSE PRACTITIONER

## 2025-04-08 PROCEDURE — 3074F SYST BP LT 130 MM HG: CPT | Performed by: NURSE PRACTITIONER

## 2025-04-08 PROCEDURE — 88175 CYTOPATH C/V AUTO FLUID REDO: CPT | Performed by: NURSE PRACTITIONER

## 2025-04-08 PROCEDURE — 87625 HPV TYPES 16 & 18 ONLY: CPT | Performed by: NURSE PRACTITIONER

## 2025-04-08 PROCEDURE — 84439 ASSAY OF FREE THYROXINE: CPT | Performed by: NURSE PRACTITIONER

## 2025-04-08 PROCEDURE — 3008F BODY MASS INDEX DOCD: CPT | Performed by: NURSE PRACTITIONER

## 2025-04-08 PROCEDURE — 80061 LIPID PANEL: CPT | Performed by: NURSE PRACTITIONER

## 2025-04-08 PROCEDURE — 99395 PREV VISIT EST AGE 18-39: CPT | Performed by: NURSE PRACTITIONER

## 2025-04-08 RX ORDER — ESCITALOPRAM OXALATE 5 MG/1
5 TABLET, FILM COATED ORAL DAILY
COMMUNITY
Start: 2025-04-02

## 2025-04-08 NOTE — PATIENT INSTRUCTIONS
MyFitnessPal Shravan    Netflix - Guide to Meditation - Headspace    Ashwagandha Supplement  Take 250-300mg/day to help with feelings of anxiety and improve sleep quality (give lexapro a good 30 days first)  Common side effects:  Stomach upset, loose stools, nausea, and drowsiness  Has been linked to liver injury in some people (so be sure to avoid alcohol and continue drinking water)  https://ods.od.nih.gov/factsheets/Ashwagandha-Consumer/      Some foods that help with GERD include bud, apples, pears, oatmeal, yogurt, avocados, walnuts, peas  Some food that make GERD worse include caffeine, carbonated beverages, citrus, spicy foods, chocolate, and smoking

## 2025-04-08 NOTE — PROGRESS NOTES
CHIEF COMPLAINT:    Chief Complaint   Patient presents with    Physical       HISTORY OF PRESENT ILLNESS:    Belkys Alvarado is a 25 year old female who has a history of GERD presents today, April 08, 2025, for an annual physical.    - NUTRITION:  Follows a regular diet.  Drinks approximately 64oz of water a day.   - SLEEP:  Sleep quality is improving.  Some difficulties falling and staying asleep.   - SAFETY:  Reports feeling safe at home.  Wears seatbelt   - PHYSICAL ACTIVITY/SOCIAL/HOBBIES:  Achieves 8,000-10,000 steps a day.  Working at [employer].  Enjoys walking and hiking as well as shopping.   - GOAL:  Belkys would like to improve health overall by reducing weight.  Goal of 150s-160lb.    Ideal body weight: 50.1 kg (110 lb 7.2 oz)  Adjusted ideal body weight: 63.8 kg (140 lb 10.7 oz)    Occasional feelings of rapid heart beat and some shortness of breath.     Psychologist - [facility] behavioral health - managing lexapro    IMMUNIZATIONS:  Declines today  CERVICAL CANCER SCREENING:  Pap completed 04/08/2025, awaiting results  BREAST CANCER SCREENING: Not yet due, no fhx  COLON CANCER SCREENING: Not yet due, no fhx    Patient Active Problem List   Diagnosis    Gastroesophageal reflux disease        ALLERGIES:  Allergies[1]    CURRENT MEDICATIONS:  Current Outpatient Medications   Medication Sig Dispense Refill    LEXAPRO 5 MG Oral Tab Take 1 tablet (5 mg total) by mouth daily.      Levonorgestrel-Ethinyl Estrad (SRONYX) 0.1-20 MG-MCG Oral Tab Take 1 tablet by mouth daily. 28 tablet 0       MEDICAL HISTORY:  Past Medical History:    Allergic rhinitis     Past Surgical History:   Procedure Laterality Date    Dental surgery procedure      Diaphragmatic hernia repair      Tonsillectomy       Family History   Problem Relation Age of Onset    COPD Father     Asthma Brother         since having COVID 2022     Family Status   Relation Status    Fa (Not Specified)    Bro (Not Specified)     Social History      Socioeconomic History    Marital status: Single   Tobacco Use    Smoking status: Never    Smokeless tobacco: Never   Vaping Use    Vaping status: Never Used   Substance and Sexual Activity    Alcohol use: Yes     Comment: social    Drug use: Never     Social Drivers of Health      [facility]    Housing Stability       ROS:    GENERAL:  Denies fever or chills  RESPIRATORY:  Denies difficulty breathing  CARDIAC:  Denies chest pain with exertion  GI:  Denies nausea, vomiting, diarrhea, constipation, or blood in stool  :  Denies blood in urine or painful urination  REPRO:  Denies vaginal discharge or pelvic pain  NEURO:  Denies recent falls   MSKL:  Denies joint stiffness or pain  SKIN:  Denies change in texture of moles   PSYCH: Denies thoughts of self harm or harming others     VITALS:    /64   Pulse 91   Temp 98.8 °F (37.1 °C) (Temporal)   Resp 16   Ht 5' 2\" (1.575 m)   Wt 186 lb (84.4 kg)   LMP  (LMP Unknown)   SpO2 99%   BMI 34.02 kg/m²     Ideal body weight: 50.1 kg (110 lb 7.2 oz)  Adjusted ideal body weight: 63.8 kg (140 lb 10.7 oz)  Wt Readings from Last 3 Encounters:   04/08/25 186 lb (84.4 kg)   03/03/24 167 lb (75.8 kg)   04/11/23 160 lb 12.8 oz (72.9 kg)     BP Readings from Last 3 Encounters:   04/08/25 120/64   03/03/24 137/87   04/11/23 110/66     Reviewed by Elizabeth Langley MS, APRN, FNP-BC    PHYSICAL EXAM:    Constitutional:       Appearance: Normal appearance.  Sitting upright on exam table.  Well developed, well nourished, and in no acute distress.  HENT:      Head: Facial features symmetric. Normocephalic and atraumatic.      Right Ear: Canal clear without erythema or drainage.  TM clear and intact, neutral in position.      Left Ear: Canal clear without erythema or drainage.  TM clear and intact, neutral in position.      Nose: Nose normal.      Mouth/Throat: Mucous membranes are moist.  Uvula rises  midline.  Eyes:      Extraocular Movements: Extraocular movements intact.      Conjunctiva/sclera: Conjunctivae normal. Sclera anicteric         Pupils: Pupils are equal, round, and reactive to light.   Neck:     Neck is supple. Trachea is midline.  No masses.      No obvious lympadenopathy with palpation of submandibular, pre/posterior auricular, anterior/posterior cervical, occipital, and supraclavicular nodes.  Cardiovascular:      Heart sounds: Regular rate and rhythm without murmur.     BLE without edema.  Pulmonary:      Effort: Pulmonary effort is normal.      Breath sounds: Lungs clear throughout.     No cough or wheezing.  Abdominal:      General: Abdomen is nondistended, bowel sounds normoactive, soft, nontender.  No organomegaly.  Musculoskeletal:         General: Normal range of motion. Strength of extremities are equal bilaterally.     Range of motion without limitations.  Skin:     General: Skin is warm and dry.      Coloration: Skin is without jaundice     Findings: No bruising or rashes  Neurological:      General: No focal deficit present. Speech is clear and organized.     Mental Status: Alert and oriented to person, place, and time.      Sensory: Sensation is intact.      Motor: Motor function is intact. Movements are smooth and controlled without ataxia.     Coordination: Coordination is intact. Coordination normal.      Gait: Gait steady and nonantalgic.      Deep Tendon Reflexes: Reflexes 2+ bilaterally.  Psychiatric:         Mood and Affect: Mood normal.         Behavior: Behavior normal.         Thought Content: Thought content normal.         Judgment: Judgment normal.     BREAST:  Breasts and nipples symmetric without rashes, lesions, dimpling, or retraction.  No masses or tenderness upon palpation.     PELVIC EXAM:  External genitalia without masses, lesions, erythema, or tenderness.  Mucosa is pink in color, moist, and without signs of inflammation or irritation.   Discharge is thin and  white, without clumping/adhering to vaginal walls.   Cervix/os pink without lesions or discoloration.    No motion tenderness. No pelvic or abdominal fullness/tenderness.   Pap test sample collected using brush and broom.  Scant amount of bleeding.  Clinical chaperone present during exam.      ASSESSMENT & PLAN:  Plan on follow-up in 1 year or earlier if needed  Refer to result notes for further information    1. Annual physical exam  Briefly discussed intermittent tachycardia and anxiety attacks  Continue lexapro and following with psychology team  - CBC With Differential With Platelet  - TSH and Free T4  - Comp Metabolic Panel (14)  - Lipid Panel    2. Screening for cervical cancer  Routine  - ThinPrep PAP Smear; Future  - Hpv Dna  High Risk , Thin Prep Collect; Future    3. Screening examination for STI  Routine  - Chlamydia/Gc Amplification           [1]   Allergies  Allergen Reactions    Albuterol HIVES, ITCHING and RASH    Dextromethorphan-Guaifenesin OTHER (SEE COMMENTS)     Heart racing, feeling out of it

## 2025-04-09 LAB — HPV E6+E7 MRNA CVX QL NAA+PROBE: POSITIVE

## 2025-04-10 LAB
HPV16 DNA CVX QL PROBE+SIG AMP: NEGATIVE
HPV18 DNA CVX QL PROBE+SIG AMP: POSITIVE

## 2025-04-11 ENCOUNTER — TELEMEDICINE (OUTPATIENT)
Dept: FAMILY MEDICINE CLINIC | Facility: CLINIC | Age: 25
End: 2025-04-11
Payer: COMMERCIAL

## 2025-04-11 DIAGNOSIS — R87.810 HUMAN PAPILLOMAVIRUS (HPV) TYPE 18 OR 45 DNA DETECTED IN CERVICAL SPECIMEN: ICD-10-CM

## 2025-04-11 DIAGNOSIS — B97.7 HPV (HUMAN PAPILLOMA VIRUS) INFECTION: Primary | ICD-10-CM

## 2025-04-11 PROCEDURE — 98004 SYNCH AUDIO-VIDEO EST SF 10: CPT | Performed by: NURSE PRACTITIONER

## 2025-04-11 NOTE — PROGRESS NOTES
VIDEO VISIT    CHIEF COMPLAINT:  Lab results    HISTORY OF PRESENT ILLNESS:  This is a 25 year old female who verbally consents to a video visit today, April 11, 2025 for follow-up on abnormal HPV testing.    Positive HPV 18/45 Genotype  Discussed health complications associated with these strains  Awaiting pap results  Recommending follow up/establish with gynecology regardless    ALLERGIES:  Allergies[1]    CURRENT MEDICATIONS:  Current Medications[2]    MEDICAL HISTORY:  Past Medical History[3]  Past Surgical History[4]  Family History[5]  Family Status   Relation Status    Fa (Not Specified)    Bro (Not Specified)    PGFA (Not Specified)     Short Social Hx on File[6]    ROS:    GENERAL:  Denies fever   RESPIRATORY:  Denies difficulty breathing  CARDIO:  Denies chest pain with exertion    VITALS:  No vitals were obtained by clinical staff during this visit.      PHYSICAL EXAM:    Physical exam is limited due to video visit.    Belkys Alvarado serves as a reliable historian.  Speech is clear and organized without difficulty speaking in full sentences.    No shortness of breath or cough noted during our conversation.      ASSESSMENT & PLAN:    1. HPV (human papilloma virus) infection  - OBG Referral - In Network    2. Human papillomavirus (HPV) type 18 or 45 DNA detected in cervical specimen  - OBG Referral - In Network       [1]   Allergies  Allergen Reactions    Albuterol HIVES, ITCHING and RASH    Dextromethorphan-Guaifenesin OTHER (SEE COMMENTS)     Heart racing, feeling out of it   [2]   Current Outpatient Medications   Medication Sig Dispense Refill    LEXAPRO 5 MG Oral Tab Take 1 tablet (5 mg total) by mouth daily.      Levonorgestrel-Ethinyl Estrad (SRONYX) 0.1-20 MG-MCG Oral Tab Take 1 tablet by mouth daily. 28 tablet 0   [3]   Past Medical History:   Allergic rhinitis    Anxiety    Esophageal reflux   [4]   Past Surgical History:  Procedure Laterality Date    Adenoidectomy      Appendectomy  2003     Dental surgery procedure      Diaphragmatic hernia repair      Hernia surgery  2003    Diaphragmatic Hernia    Tonsillectomy     [5]   Family History  Problem Relation Age of Onset    COPD Father     Asthma Brother         since having COVID 2022    Cancer Paternal Grandfather     Depression Paternal Grandfather    [6]   Social History  Socioeconomic History    Marital status: Single   Tobacco Use    Smoking status: Never    Smokeless tobacco: Never   Vaping Use    Vaping status: Never Used   Substance and Sexual Activity    Alcohol use: Not Currently     Comment: social    Drug use: Not Currently     Types: Cannabis   Other Topics Concern    Caffeine Concern Yes     Comment: I consume caffeine every day.    Exercise Yes     Comment: here and there i exercise    Seat Belt No    Special Diet No    Stress Concern No    Weight Concern Yes     Comment: trying to lose weight     Social Drivers of Health      Received from [facility]    Housing Stability

## 2025-05-27 ENCOUNTER — PATIENT MESSAGE (OUTPATIENT)
Dept: FAMILY MEDICINE CLINIC | Facility: CLINIC | Age: 25
End: 2025-05-27

## 2025-05-27 ENCOUNTER — TELEPHONE (OUTPATIENT)
Dept: OBGYN CLINIC | Facility: CLINIC | Age: 25
End: 2025-05-27

## 2025-05-27 NOTE — TELEPHONE ENCOUNTER
See [employer] message      \"Positive HPV 18/45 Genotype  Awaiting pap results  Requesting that Belkys establishes with gynecology team regardless\"    Pap resutls = Atypical cells identified  HPV+   Per guidelines, we repeat pap in 1 year     Please set up appointment with gynecology team in 1 year for repeat pap, earlier if any change in menstrual cycle or heavier periods

## (undated) NOTE — LETTER
Date: 7/12/2023    Patient Name: Magi Milligan        To Whom it may concern: This letter has been written at the patient's request.  Antonieta Jose was evaluated for a medical condition via a Telehealth Visit with me this afternoon. Ms. Ellie Espinal has allowed me to share that she has begun to develop viral illness symptoms. It is difficult to predict how many days she will be ill, but I would predict that she would be ill and contagious for at least a couple of days. I will not be available to reassess Ms. Alvarado's progress tomorrow. Please excuse her from work today through Friday 7/14/23. She has not been tested for COVID, but does not have any suspicious symptoms consistent with COVID at this time. Ms. Ellie Espinal has been advised to seek further evaluation if she has not improved after that timeframe.           Sincerely,    SILVIO Camara  Nurse Practitioner  [address] [facility]

## (undated) NOTE — MR AVS SNAPSHOT
After Visit Summary   7/12/2023    Julieta Whiteside   MRN: [id number]           Visit Information     Date & Time  7/12/2023  2:00 PM Provider  SILVIO Stephens, Virtual Visit Dept. Phone  [phone]      Your Vitals Were     LMP   03/02/2023 (Exact Date)             Allergies as of 7/12/2023  Review status set to In Progress on 7/12/2023       Noted Reaction Type Reactions    Albuterol 08/09/2022    HIVES, ITCHING, RASH    Dextromethorphan-guaifenesin 03/23/2023    OTHER (SEE COMMENTS)    Heart racing, feeling out of it      Your Current Medications        Dosage    Levonorgestrel-Ethinyl Estrad (SRONYX) 0.1-20 MG-MCG Oral Tab Take 1 tablet by mouth daily. pantoprazole 40 MG Oral Tab EC Take 1 tablet (40 mg total) by mouth every morning before breakfast.    loratadine (CLARITIN) 10 MG Oral Tab Take 1 tablet (10 mg total) by mouth daily. Diagnoses for This Visit    Acute viral syndrome   [427147]  -  Primary           We Ordered the Following     Normal Orders This Visit    OL DIG E/M SVC 11-20 MIN [90224 CPT(R)]                 Did you know that [facility] primary care physicians now offer Video Visits through [address] for adult patients for a variety of conditions such as allergies, back pain and cold symptoms? Skip the drive and waiting room and online chat with a doctor face-to-face using your web-cam enabled computer or mobile device wherever you are. Video Visits cost $50 and can be paid hassle-free using a credit, debit, or health savings card. Not active on [employer]? Ask us how to get signed up today! If you receive a survey from hint, please take a few minutes to complete it and provide feedback. We strive to deliver the best patient experience and are looking for ways to make improvements. Your feedback will help us do so. For more information on [employer], please visit [email or web address] No text in SmartText           No text in SmartText

## (undated) NOTE — LETTER
Date: 2/28/2023    Patient Name: Ishmael Danielle          To Whom it may concern: This letter has been written at the patient's request. The above patient was seen at the [facility] for treatment of a medical condition. This patient should be excused from attending work today 2/28/2023 due to a medical issue.           Sincerely,       Arnoldo Walker PA-C

## (undated) NOTE — LETTER
Date: 4/11/2023    Patient Name: Jose Contreras          To Whom it may concern: This letter has been written at the patient's request. The above patient was seen at the [facility] for treatment of a medical condition. This patient should be excused from attending work today 4/11/2023. She was seen in our clinic and is under our care.         Sincerely,      Misael Nuñez PA-C

## (undated) NOTE — LETTER
Date: 3/6/2023    Patient Name: Kimani Brannon          To Whom it may concern: This letter has been written at the patient's request. The above patient was seen at the [facility] for treatment of a medical condition. She gave me permission to include her personal health information in the letter. Cameron Juarez had a + home covid-19 test today. Please excuse her absences while she quarantines per CDC guidelines. (Self quarantine until 5 days from onset of symptoms. If you have no symptoms or your symptoms are resolving after 5 days, you can leave your house. Continue to wear a mask around others for 5 additional days.  If symptoms not resolved at 5 days continue quarantine for up to 10 days from onset of symptoms AND no fever for at least 24hrs, AND and improvement in symptoms.)            Sincerely,    MIGNON Freeman